# Patient Record
Sex: MALE | Race: WHITE | URBAN - METROPOLITAN AREA
[De-identification: names, ages, dates, MRNs, and addresses within clinical notes are randomized per-mention and may not be internally consistent; named-entity substitution may affect disease eponyms.]

---

## 2023-02-01 ENCOUNTER — OFFICE VISIT (OUTPATIENT)
Facility: LOCATION | Age: 48
End: 2023-02-01
Payer: COMMERCIAL

## 2023-02-01 DIAGNOSIS — S05.02XD CORNEAL ABRASION OF LEFT EYE, SUBSEQUENT ENCOUNTER: ICD-10-CM

## 2023-02-01 DIAGNOSIS — S05.01XD CORNEAL ABRASION OF RIGHT EYE, SUBSEQUENT ENCOUNTER: Primary | ICD-10-CM

## 2023-02-01 PROCEDURE — 92002 INTRM OPH EXAM NEW PATIENT: CPT

## 2023-02-01 ASSESSMENT — INTRAOCULAR PRESSURE
OD: 18
OS: 18

## 2023-02-01 NOTE — IMPRESSION/PLAN
Impression: Corneal abrasion of right eye, subsequent encounter: S05. [id number]. (-)abrasion 
(-)SEI Plan: Pt educated on condition. ER gave pt anti-biotic  ointment for cover. Recommended artificial tears QID to prn for comfort. Instructed patient to notify clinic if symptoms worsen or do not improve.  RTC PRN